# Patient Record
Sex: FEMALE | Race: WHITE | URBAN - METROPOLITAN AREA
[De-identification: names, ages, dates, MRNs, and addresses within clinical notes are randomized per-mention and may not be internally consistent; named-entity substitution may affect disease eponyms.]

---

## 2019-01-26 ENCOUNTER — IMPORTED ENCOUNTER (OUTPATIENT)
Dept: URBAN - METROPOLITAN AREA CLINIC 1 | Facility: CLINIC | Age: 84
End: 2019-01-26

## 2019-01-26 PROBLEM — H01.001: Noted: 2019-01-26

## 2019-01-26 PROBLEM — H18.50: Noted: 2019-01-26

## 2019-01-26 PROBLEM — H25.813: Noted: 2019-01-26

## 2019-01-26 PROBLEM — H01.004: Noted: 2019-01-26

## 2019-01-26 PROBLEM — H00.15: Noted: 2019-01-26

## 2019-01-26 PROBLEM — H40.1331: Noted: 2019-01-26

## 2019-01-26 PROCEDURE — 92012 INTRM OPH EXAM EST PATIENT: CPT

## 2019-01-26 NOTE — PATIENT DISCUSSION
1.  Chalazion LLL - Increase Hot compresses TID x 5 minutes for 3 weeks. If without improvement discussed with patient possible Incision and Drainage procedure. Risks and benefits discussed with patient and patient states full understanding. Use Baby shampoo lid scrubs. Will recheck patient as scheduled as scheduled. Consider I&D of not 2. Anterior Blepharitis OU - Increase Daily Hot compresses to TID and lid scrubs were recommended. 3. Cataract OU: Observe for now without intervention. The patient was advised to contact us if any change or worsening of vision4. MDF Corneal Dystrophy OU- observe5. 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  F/u as scheduled

## 2019-02-04 ENCOUNTER — IMPORTED ENCOUNTER (OUTPATIENT)
Dept: URBAN - METROPOLITAN AREA CLINIC 1 | Facility: CLINIC | Age: 84
End: 2019-02-04

## 2019-02-04 PROBLEM — H01.002: Noted: 2019-02-04

## 2019-02-04 PROBLEM — H25.813: Noted: 2019-02-04

## 2019-02-04 PROBLEM — H01.005: Noted: 2019-02-04

## 2019-02-04 PROBLEM — H01.004: Noted: 2019-02-04

## 2019-02-04 PROBLEM — H01.001: Noted: 2019-02-04

## 2019-02-04 PROBLEM — H21.233: Noted: 2019-02-04

## 2019-02-04 PROBLEM — H18.50: Noted: 2019-02-04

## 2019-02-04 PROCEDURE — 92014 COMPRE OPH EXAM EST PT 1/>: CPT

## 2019-02-04 PROCEDURE — 92015 DETERMINE REFRACTIVE STATE: CPT

## 2019-02-04 NOTE — PATIENT DISCUSSION
1.  Cataracts OU -- Visually Significant secondary to glare discussed the risks benefits alternatives and limitations of cataract surgery. The patient stated a full understanding and a desire to proceed with the procedure. The patient will need to return for preop appointment with cataract measurements and to have any additional questions answered and start pre-operative eye drops as directed. Not examined by [facility] today. Phaco PCL OS first then OD. 2. Anterior Blepharitis OU - Daily Hot compresses / lid scrubs were recommended. Begin Ocusoft Hypochlor Spray BID to lids. Can use Erythromycin Vale QHS OU X's 1 week then d/c.3. Corneal Dystrophy OU -- Stable. Observe / Monitor. 4.  Pigment Dispersion Syndrome OU -- Stable. Observe / Monitor. 5.  Nasal FELIPE Papilloma -- Observe / Monitor. 6.  Central LLL Chalazion -- Resolving. Return for the next available Abraham / Caroline Hernandez / H & P appointment with Dr. Collin Lentz.

## 2021-06-10 ENCOUNTER — IMPORTED ENCOUNTER (OUTPATIENT)
Dept: URBAN - METROPOLITAN AREA CLINIC 1 | Facility: CLINIC | Age: 86
End: 2021-06-10

## 2021-06-10 PROBLEM — D23.121: Noted: 2021-06-10

## 2021-06-10 PROBLEM — H25.813: Noted: 2021-06-10

## 2021-06-10 PROBLEM — D48.9: Noted: 2021-06-10

## 2021-06-10 PROCEDURE — 99214 OFFICE O/P EST MOD 30 MIN: CPT

## 2021-06-10 NOTE — PATIENT DISCUSSION
1.  FELIPE Mass of uncertain behavior- Plan excisional biopsy with pathology w/ Local Anesthesia. 2. Cataract OU: Observe for now without intervention. The patient was advised to contact us if any change or worsening of vision3. Anterior Blepharitis OU - Daily Hot compresses / lid scrubs were recommended. Ocusoft Hypochlor Spray BID to lids. 4.  Corneal Dystrophy OU - Stable. Observe / Monitor. 5.  Pigment Dispersion Syndrome OU - Stable. Observe / Rivera Harper for an appointment in Excisional Biopsy w/ Path FELIPE with Dr. Myles Christensen.

## 2021-06-10 NOTE — PATIENT DISCUSSION
1.  Papilloma FELIPE OS - The patient has a lesion of the left upper eyelid which appears benign. Measurements were taken and observation at regular intervals was recommended. The patient was asked to report  if there is any growth. 2. Cataracts OU - Visually significant. Pt scheduled to see PMG next month. 3.  Anterior Blepharitis OU - Daily Hot compresses / lid scrubs were recommended. 4. Corneal Dystrophy OU -- Stable. Observe / Monitor. 5.  Pigment Dispersion Syndrome OU -- Stable. Observe / Monitor.

## 2021-06-18 ENCOUNTER — IMPORTED ENCOUNTER (OUTPATIENT)
Dept: URBAN - METROPOLITAN AREA CLINIC 1 | Facility: CLINIC | Age: 86
End: 2021-06-18

## 2021-07-24 ENCOUNTER — IMPORTED ENCOUNTER (OUTPATIENT)
Dept: URBAN - METROPOLITAN AREA CLINIC 1 | Facility: CLINIC | Age: 86
End: 2021-07-24

## 2021-07-24 PROBLEM — H18.503: Noted: 2021-07-24

## 2021-07-24 PROBLEM — H01.02A: Noted: 2021-07-24

## 2021-07-24 PROBLEM — H01.001: Noted: 2021-07-24

## 2021-07-24 PROBLEM — H40.89: Noted: 2021-07-24

## 2021-07-24 PROBLEM — H18.50: Noted: 2021-07-24

## 2021-07-24 PROBLEM — H25.813: Noted: 2021-07-24

## 2021-07-24 PROBLEM — H01.021: Noted: 2021-07-24

## 2021-07-24 PROBLEM — H01.024: Noted: 2021-07-24

## 2021-07-24 PROBLEM — H01.004: Noted: 2021-07-24

## 2021-07-24 PROBLEM — H01.02B: Noted: 2021-07-24

## 2021-07-24 PROCEDURE — 92015 DETERMINE REFRACTIVE STATE: CPT

## 2021-07-24 PROCEDURE — 99214 OFFICE O/P EST MOD 30 MIN: CPT

## 2021-07-24 NOTE — PATIENT DISCUSSION
1.  Cataract OU:  Visually Significant discussed the risks benefits alternatives and limitations of cataract surgery. The patient stated a full understanding and a desire to proceed with the procedure. The patient will need to return for preop appointment with cataract measurements and to have any additional questions answered and start pre-operative eye drops as directed. **Interested in MF IOL if candidate Probable Toric  Phaco PCL OS then OD Otherwise follow-up 6 months DFE/glare 2. Corneal Dystrophy OU- observe 3. Pigmentary Dispersion Syndrome OU - Observe  4. Anterior Blepharitis OU - Daily Hot compresses and lid scrubs were recommended. 5. S/p Excisional Biopsy FELIPE - Path results BenignReturn for an appointment for Ascan/H and P. with Dr. Sharon Childs.

## 2021-08-23 ENCOUNTER — IMPORTED ENCOUNTER (OUTPATIENT)
Dept: URBAN - METROPOLITAN AREA CLINIC 1 | Facility: CLINIC | Age: 86
End: 2021-08-23

## 2021-08-23 PROBLEM — H25.812: Noted: 2021-08-23

## 2021-08-23 PROCEDURE — 92136 OPHTHALMIC BIOMETRY: CPT

## 2021-08-23 NOTE — PATIENT DISCUSSION
1. Cataract OS:  Visually Significant secondary to glare discussed the risks benefits alternatives and limitations of cataract surgery. The patient stated a full understanding and a desire to proceed with the procedure. Discussed with patient if PO Gtts are more than $120 for all three combined when filling at their Pharmacy please call our office to request generic substitutions. Lifestyle Questionnaire Completed and Discussed with patient and patient understands OTC readers may be needed for fine print with the MF lens choice. Pt understood. *Guarded prognosis H/o Corneal dystrophy.  Phaco PCL OS Continue Regimen: Mild cleansers and moisturizers daily Detail Level: Zone Initiate Treatment: DermaSmoothe Body Oil QD prn flare, and TAC Ointment BID prn flare. Humidifier in room at night, Ointment with damp onesie at bedtime. Render In Strict Bullet Format?: No

## 2021-09-01 ENCOUNTER — IMPORTED ENCOUNTER (OUTPATIENT)
Dept: URBAN - METROPOLITAN AREA CLINIC 1 | Facility: CLINIC | Age: 86
End: 2021-09-01

## 2021-09-02 ENCOUNTER — IMPORTED ENCOUNTER (OUTPATIENT)
Dept: URBAN - METROPOLITAN AREA CLINIC 1 | Facility: CLINIC | Age: 86
End: 2021-09-02

## 2021-09-02 PROBLEM — Z96.1: Noted: 2021-09-02

## 2021-09-02 PROCEDURE — 99024 POSTOP FOLLOW-UP VISIT: CPT

## 2021-09-02 NOTE — PATIENT DISCUSSION
POD#1 CE/IOL Vivity toric MF w/ LenSx OS doing well. 1 gtt Combigan applied prior to leaving. Use Tobradex BID OS and Prolensa Qdaily OS: Use all three gtts through completion of PO gtt chart regimen/ Per our instructions given to patient.   Post op Warnings Reiterated RTC as scheduled

## 2021-09-10 ENCOUNTER — IMPORTED ENCOUNTER (OUTPATIENT)
Dept: URBAN - METROPOLITAN AREA CLINIC 1 | Facility: CLINIC | Age: 86
End: 2021-09-10

## 2021-09-10 PROBLEM — H25.811: Noted: 2021-09-10

## 2021-09-10 PROCEDURE — 92136 OPHTHALMIC BIOMETRY: CPT

## 2021-09-10 NOTE — PATIENT DISCUSSION
1.  Cataract OD: Visually Significant secondary to glare discussed the risks benefits alternatives and limitations of cataract surgery. The patient stated a full understanding and a desire to proceed with the procedure. Discussed with patient if PO Gtts are more than $120 for all three combined when filling at their Pharmacy please call our office to request generic substitutions. *Not Vivity MF candidate due to high degree of Astigmatism OD. Patient understood. Phaco PCL OD 2. POW#3  CE/IOL Vivity Toric w/ LenSx OS doing well. Use Tobradex ST BID OS Prolensa Qdaily OS: Use gtts through completion of PO gtt chart regimen.  F/u as scheduled 2nd eye

## 2021-09-17 ENCOUNTER — IMPORTED ENCOUNTER (OUTPATIENT)
Dept: URBAN - METROPOLITAN AREA CLINIC 1 | Facility: CLINIC | Age: 86
End: 2021-09-17

## 2021-09-18 ENCOUNTER — IMPORTED ENCOUNTER (OUTPATIENT)
Dept: URBAN - METROPOLITAN AREA CLINIC 1 | Facility: CLINIC | Age: 86
End: 2021-09-18

## 2021-09-18 PROBLEM — Z96.1: Noted: 2021-09-18

## 2021-09-18 PROCEDURE — 99024 POSTOP FOLLOW-UP VISIT: CPT

## 2021-09-18 NOTE — PATIENT DISCUSSION
1. POD#1 Phaco/ PCL OD (Toric w/ LenSx) - doing well. Corneal Edema noted on today's exam Use Tobradex ST BID OD Prolensa Qdaily OD: Use all gtts through completion of PO gtt chart regimen/ Per our instructions given. Post op Warnings Reiterated 2. POW#3 Phaco/ PCL OS (Vivity Toric MF w/ LenSx) - doing well  Use Tobradex ST BID OS & Prolensa Qdaily OS: Use through completion of po gtt regimen.  RTC as scheduled

## 2021-10-11 ENCOUNTER — IMPORTED ENCOUNTER (OUTPATIENT)
Dept: URBAN - METROPOLITAN AREA CLINIC 1 | Facility: CLINIC | Age: 86
End: 2021-10-11

## 2021-10-11 PROBLEM — Z09: Noted: 2021-10-11

## 2021-10-11 PROCEDURE — 99024 POSTOP FOLLOW-UP VISIT: CPT

## 2021-10-11 NOTE — PATIENT DISCUSSION
1. POW#3 Phaco/ PCL OU (Toric w/LenSx OD Vivity Toric MF w/LenSx OS)Finish PO meds per PO gtt schedule Could consider laser vision correction if not wanting to use glasses. Pt declined at this time. MRX for glasses given for lined bifocal and also consideration of separate pair for reading. Return for an appointment in July 30/OCT with Dr. Ria Perez.

## 2022-04-02 ASSESSMENT — VISUAL ACUITY
OD_SC: 20/40
OS_SC: J7
OU_SC: J3
OS_SC: J6
OD_SC: 20/60
OS_CC: 20/30
OD_SC: 20/40
OD_CC: 20/200
OS_GLARE: 20/400
OD_SC: 20/30
OS_CC: 20/30-2
OD_CC: 20/40-1
OD_CC: 20/40
OS_SC: J7
OS_SC: 20/30
OS_CC: 20/200
OD_GLARE: 20/400
OS_CC: 20/50
OS_SC: 20/40
OD_GLARE: 20/100
OS_SC: J4
OS_CC: J2
OD_PH: CC 20/25
OS_SC: 20/60
OS_SC: 20/40
OD_CC: J2
OS_PH: CC 20/20
OS_GLARE: 20/100
OS_CC: 20/40
OU_CC: 20/25

## 2022-04-02 ASSESSMENT — TONOMETRY
OD_IOP_MMHG: 18
OS_IOP_MMHG: 18
OD_IOP_MMHG: 13
OS_IOP_MMHG: 23
OS_IOP_MMHG: 17
OS_IOP_MMHG: 13
OS_IOP_MMHG: 21
OS_IOP_MMHG: 18
OS_IOP_MMHG: 17
OD_IOP_MMHG: 20
OD_IOP_MMHG: 17
OD_IOP_MMHG: 22

## 2022-07-14 ENCOUNTER — COMPREHENSIVE EXAM (OUTPATIENT)
Dept: URBAN - METROPOLITAN AREA CLINIC 1 | Facility: CLINIC | Age: 87
End: 2022-07-14

## 2022-07-14 DIAGNOSIS — H18.503: ICD-10-CM

## 2022-07-14 DIAGNOSIS — H01.024: ICD-10-CM

## 2022-07-14 DIAGNOSIS — H21.233: ICD-10-CM

## 2022-07-14 DIAGNOSIS — Z96.1: ICD-10-CM

## 2022-07-14 DIAGNOSIS — H35.3131: ICD-10-CM

## 2022-07-14 DIAGNOSIS — H01.021: ICD-10-CM

## 2022-07-14 PROCEDURE — 92133 CPTRZD OPH DX IMG PST SGM ON: CPT

## 2022-07-14 PROCEDURE — 92015 DETERMINE REFRACTIVE STATE: CPT

## 2022-07-14 PROCEDURE — 92012 INTRM OPH EXAM EST PATIENT: CPT

## 2022-07-14 ASSESSMENT — VISUAL ACUITY
OD_SC: 20/30
OS_SC: 20/30

## 2022-07-14 ASSESSMENT — TONOMETRY
OD_IOP_MMHG: 16
OS_IOP_MMHG: 16

## 2022-07-14 NOTE — PATIENT DISCUSSION
Patient understands condition, prognosis and need for follow up care. IOP stable OU. Baseline OCT ON performed today, WNL, stable OU.

## 2023-11-22 ENCOUNTER — EMERGENCY VISIT (OUTPATIENT)
Dept: URBAN - METROPOLITAN AREA CLINIC 1 | Facility: CLINIC | Age: 88
End: 2023-11-22

## 2023-11-22 DIAGNOSIS — H01.02A: ICD-10-CM

## 2023-11-22 DIAGNOSIS — H01.02B: ICD-10-CM

## 2023-11-22 DIAGNOSIS — H02.88B: ICD-10-CM

## 2023-11-22 DIAGNOSIS — H02.88A: ICD-10-CM

## 2023-11-22 PROCEDURE — 99213 OFFICE O/P EST LOW 20 MIN: CPT

## 2023-11-22 ASSESSMENT — VISUAL ACUITY
OS_CC: 20/25
OD_CC: 20/20-2

## 2023-11-22 ASSESSMENT — TONOMETRY
OD_IOP_MMHG: 17
OS_IOP_MMHG: 16

## 2024-11-25 ENCOUNTER — COMPREHENSIVE EXAM (OUTPATIENT)
Dept: URBAN - METROPOLITAN AREA CLINIC 1 | Facility: CLINIC | Age: 89
End: 2024-11-25

## 2024-11-25 DIAGNOSIS — H02.88B: ICD-10-CM

## 2024-11-25 DIAGNOSIS — Z96.1: ICD-10-CM

## 2024-11-25 DIAGNOSIS — H01.02B: ICD-10-CM

## 2024-11-25 DIAGNOSIS — H35.3132: ICD-10-CM

## 2024-11-25 DIAGNOSIS — H21.233: ICD-10-CM

## 2024-11-25 DIAGNOSIS — H01.02A: ICD-10-CM

## 2024-11-25 DIAGNOSIS — L82.1: ICD-10-CM

## 2024-11-25 DIAGNOSIS — H18.503: ICD-10-CM

## 2024-11-25 DIAGNOSIS — H02.88A: ICD-10-CM

## 2024-11-25 PROCEDURE — 99214 OFFICE O/P EST MOD 30 MIN: CPT

## 2025-05-29 ENCOUNTER — FOLLOW UP (OUTPATIENT)
Age: 89
End: 2025-05-29

## 2025-05-29 DIAGNOSIS — H35.3132: ICD-10-CM

## 2025-05-29 PROCEDURE — 92134 CPTRZ OPH DX IMG PST SGM RTA: CPT

## 2025-05-29 PROCEDURE — 99213 OFFICE O/P EST LOW 20 MIN: CPT
